# Patient Record
Sex: FEMALE | Race: WHITE | NOT HISPANIC OR LATINO | URBAN - METROPOLITAN AREA
[De-identification: names, ages, dates, MRNs, and addresses within clinical notes are randomized per-mention and may not be internally consistent; named-entity substitution may affect disease eponyms.]

---

## 2017-11-28 ENCOUNTER — IMPORTED ENCOUNTER (OUTPATIENT)
Dept: URBAN - METROPOLITAN AREA CLINIC 9 | Facility: CLINIC | Age: 77
End: 2017-11-28

## 2018-01-19 NOTE — PATIENT DISCUSSION
Patient was upset and argumentative because she only wanted to discuss her smudgy vision with me today.  She did not want to have an exam or be dilated today.  I told her that even if we discussed her complaints she would need a comprehensive eye exam to fully assess her complaints.  She was upset that she also had another appointment at 4 pm and she was still in the office.  She declined/refused to be dilated in the OS against medical advice.  Patient complained that there was poor communication with the schedulers.  Again she was told I would have to examine her in order to diagnosis and treat her.   Patient was uncooperative at times due to her being upset.

## 2018-01-29 ENCOUNTER — IMPORTED ENCOUNTER (OUTPATIENT)
Dept: URBAN - METROPOLITAN AREA CLINIC 9 | Facility: CLINIC | Age: 78
End: 2018-01-29

## 2020-06-15 ENCOUNTER — IMPORTED ENCOUNTER (OUTPATIENT)
Dept: URBAN - METROPOLITAN AREA CLINIC 9 | Facility: CLINIC | Age: 80
End: 2020-06-15

## 2021-06-22 ENCOUNTER — IMPORTED ENCOUNTER (OUTPATIENT)
Dept: URBAN - METROPOLITAN AREA CLINIC 9 | Facility: CLINIC | Age: 81
End: 2021-06-22

## 2021-06-22 PROBLEM — H16.223: Noted: 2021-06-22

## 2021-06-22 PROBLEM — H43.812: Noted: 2021-06-22

## 2021-06-22 PROBLEM — H35.373: Noted: 2021-06-22

## 2021-10-19 ASSESSMENT — VISUAL ACUITY
OS_CC: 20/25 SN
OD_CC: 20/25 SN
OD_CC: 20/25 SN
OD_SC: 20/25 - SN
OS_SC: 20/400 SN
OD_SC: 20/30 SN
OD_SC: 20/25 SN
OS_CC: 20/25 SN
OD_CC: 20/20 SN
OS_CC: 20/25 SN
OD_SC: 20/25 SN
OD_CC: 20/25 SN
OS_CC: 20/25 SN

## 2021-10-19 ASSESSMENT — TONOMETRY
OS_IOP_MMHG: 16
OD_IOP_MMHG: 12
OS_IOP_MMHG: 11
OD_IOP_MMHG: 12
OD_IOP_MMHG: 14
OD_IOP_MMHG: 14
OS_IOP_MMHG: 11
OS_IOP_MMHG: 14

## 2022-06-22 RX ORDER — DENOSUMAB 60 MG/ML
INJECTION SUBCUTANEOUS
COMMUNITY
Start: 2020-10-19 | End: 2022-09-02

## 2022-06-22 RX ORDER — OMEPRAZOLE 20 MG/1
CAPSULE, DELAYED RELEASE ORAL
COMMUNITY
End: 2022-08-04 | Stop reason: SDUPTHER

## 2022-06-22 RX ORDER — BENZONATATE 100 MG/1
CAPSULE ORAL
COMMUNITY

## 2022-06-22 RX ORDER — CITALOPRAM 10 MG/1
15 TABLET ORAL 2 TIMES DAILY
COMMUNITY

## 2022-06-22 RX ORDER — OXYBUTYNIN CHLORIDE 15 MG/1
15 TABLET, EXTENDED RELEASE ORAL DAILY
COMMUNITY
End: 2022-10-10

## 2022-06-22 RX ORDER — ALBUTEROL SULFATE 90 UG/1
AEROSOL, METERED RESPIRATORY (INHALATION)
COMMUNITY

## 2022-06-22 RX ORDER — FLUTICASONE PROPIONATE 50 MCG
SPRAY, SUSPENSION (ML) NASAL
COMMUNITY

## 2022-06-22 RX ORDER — CYCLOSPORINE 0.5 MG/ML
EMULSION OPHTHALMIC
COMMUNITY
End: 2022-10-10

## 2022-06-22 RX ORDER — MELOXICAM 15 MG/1
TABLET ORAL
COMMUNITY

## 2022-06-22 RX ORDER — ATORVASTATIN CALCIUM 20 MG/1
TABLET, FILM COATED ORAL
COMMUNITY
End: 2022-08-04 | Stop reason: SDUPTHER

## 2022-06-22 RX ORDER — MONTELUKAST SODIUM 10 MG/1
TABLET ORAL
COMMUNITY
End: 2022-08-04 | Stop reason: SDUPTHER

## 2022-06-22 RX ORDER — LEVOTHYROXINE SODIUM 0.07 MG/1
TABLET ORAL
COMMUNITY
End: 2022-08-04 | Stop reason: SDUPTHER

## 2022-08-08 PROBLEM — M79.671 FOOT PAIN, RIGHT: Status: ACTIVE | Noted: 2022-08-08

## 2022-08-08 PROBLEM — J30.9 ALLERGIC RHINITIS DUE TO ALLERGEN: Status: ACTIVE | Noted: 2022-08-08

## 2022-08-08 PROBLEM — J42 CHRONIC BRONCHITIS (HCC): Status: ACTIVE | Noted: 2022-08-08

## 2022-08-08 PROBLEM — J45.991 COUGH VARIANT ASTHMA: Status: ACTIVE | Noted: 2022-08-08

## 2022-08-08 PROBLEM — R51.9 BILATERAL HEADACHES: Status: ACTIVE | Noted: 2022-08-08

## 2022-08-08 PROBLEM — I89.0 LYMPHEDEMA: Status: ACTIVE | Noted: 2022-08-08

## 2022-08-08 PROBLEM — K29.80 DUODENITIS: Status: ACTIVE | Noted: 2022-08-08

## 2022-08-08 PROBLEM — M81.0 AGE-RELATED OSTEOPOROSIS WITHOUT CURRENT PATHOLOGICAL FRACTURE: Status: ACTIVE | Noted: 2022-08-08

## 2022-08-08 PROBLEM — M81.0 OSTEOPOROSIS: Status: ACTIVE | Noted: 2022-08-08

## 2022-08-08 PROBLEM — J45.41 MODERATE PERSISTENT ASTHMA WITH EXACERBATION: Status: ACTIVE | Noted: 2022-08-08

## 2022-08-08 PROBLEM — E78.00 ELEVATED LDL CHOLESTEROL LEVEL: Status: ACTIVE | Noted: 2022-08-08

## 2022-08-08 PROBLEM — E03.9 HYPOTHYROIDISM, ACQUIRED: Status: ACTIVE | Noted: 2022-08-08

## 2022-08-08 PROBLEM — M35.3 POLYMYALGIA RHEUMATICA (HCC): Status: ACTIVE | Noted: 2022-08-08

## 2022-08-08 PROBLEM — M25.512 PAIN IN LEFT SHOULDER: Status: ACTIVE | Noted: 2022-08-08

## 2022-08-08 PROBLEM — N95.9 MENOPAUSAL AND POSTMENOPAUSAL DISORDER: Status: ACTIVE | Noted: 2022-08-08

## 2022-08-08 PROBLEM — F32.9 REACTIVE DEPRESSION: Status: ACTIVE | Noted: 2022-08-08

## 2022-08-08 PROBLEM — J32.9 CHRONIC SINUSITIS: Status: ACTIVE | Noted: 2022-08-08

## 2022-08-08 PROBLEM — C50.912 MALIGNANT NEOPLASM OF LEFT FEMALE BREAST (HCC): Status: ACTIVE | Noted: 2022-08-08

## 2022-08-08 PROBLEM — E55.9 VITAMIN D DEFICIENCY: Status: ACTIVE | Noted: 2022-08-08

## 2022-08-08 PROBLEM — N32.81 OVERACTIVE BLADDER: Status: ACTIVE | Noted: 2022-08-08

## 2022-08-08 PROBLEM — I49.5 TACHY-BRADY SYNDROME (HCC): Status: ACTIVE | Noted: 2022-08-08

## 2022-08-08 PROBLEM — E78.00 PURE HYPERCHOLESTEROLEMIA: Status: ACTIVE | Noted: 2022-08-08

## 2022-08-08 PROBLEM — R26.81 UNSTABLE GAIT: Status: ACTIVE | Noted: 2022-08-08

## 2022-08-08 PROBLEM — M67.912 DYSFUNCTION OF LEFT ROTATOR CUFF: Status: ACTIVE | Noted: 2022-08-08

## 2022-08-08 PROBLEM — R73.9 HYPERGLYCEMIA: Status: ACTIVE | Noted: 2022-08-08

## 2022-08-08 PROBLEM — H65.06 RECURRENT ACUTE SEROUS OTITIS MEDIA OF BOTH EARS: Status: ACTIVE | Noted: 2022-08-08

## 2022-08-08 PROBLEM — F41.9 ANXIETY: Status: ACTIVE | Noted: 2022-08-08

## 2022-08-08 PROBLEM — M54.2 CERVICALGIA: Status: ACTIVE | Noted: 2022-08-08

## 2022-08-19 ENCOUNTER — ESTABLISHED PATIENT (OUTPATIENT)
Dept: URBAN - METROPOLITAN AREA CLINIC 9 | Facility: CLINIC | Age: 82
End: 2022-08-19

## 2022-08-19 DIAGNOSIS — H16.223: ICD-10-CM

## 2022-08-19 DIAGNOSIS — H35.373: ICD-10-CM

## 2022-08-19 DIAGNOSIS — H43.812: ICD-10-CM

## 2022-08-19 PROCEDURE — 92015 DETERMINE REFRACTIVE STATE: CPT

## 2022-08-19 PROCEDURE — 92134 CPTRZ OPH DX IMG PST SGM RTA: CPT

## 2022-08-19 PROCEDURE — 92014 COMPRE OPH EXAM EST PT 1/>: CPT

## 2022-08-19 ASSESSMENT — VISUAL ACUITY: OD_SC: 20/20

## 2022-08-19 ASSESSMENT — TONOMETRY
OS_IOP_MMHG: 13
OD_IOP_MMHG: 12

## 2022-08-30 PROBLEM — R32 URINARY INCONTINENCE: Status: ACTIVE | Noted: 2022-08-30

## 2022-08-30 PROBLEM — R00.1 BRADYCARDIA: Status: ACTIVE | Noted: 2022-08-30

## 2022-08-30 PROBLEM — Z00.00 MEDICARE ANNUAL WELLNESS VISIT, SUBSEQUENT: Status: ACTIVE | Noted: 2022-08-30

## 2022-09-29 PROBLEM — Z00.00 MEDICARE ANNUAL WELLNESS VISIT, SUBSEQUENT: Status: RESOLVED | Noted: 2022-08-30 | Resolved: 2022-09-29

## 2022-10-10 PROBLEM — Z87.81 STATUS POST OPEN REDUCTION AND INTERNAL FIXATION (ORIF) OF FRACTURE: Status: ACTIVE | Noted: 2022-10-10

## 2022-10-10 PROBLEM — Z23 NEED FOR INFLUENZA VACCINATION: Status: ACTIVE | Noted: 2022-10-10

## 2022-10-10 PROBLEM — Z98.890 STATUS POST OPEN REDUCTION AND INTERNAL FIXATION (ORIF) OF FRACTURE: Status: ACTIVE | Noted: 2022-10-10

## 2022-10-11 PROBLEM — M79.671 ACUTE FOOT PAIN, RIGHT: Status: ACTIVE | Noted: 2022-10-11

## 2023-08-25 ENCOUNTER — ESTABLISHED PATIENT (OUTPATIENT)
Dept: URBAN - METROPOLITAN AREA CLINIC 9 | Facility: CLINIC | Age: 83
End: 2023-08-25

## 2023-08-25 DIAGNOSIS — H35.373: ICD-10-CM

## 2023-08-25 DIAGNOSIS — H44.23: ICD-10-CM

## 2023-08-25 DIAGNOSIS — Z96.1: ICD-10-CM

## 2023-08-25 DIAGNOSIS — H16.223: ICD-10-CM

## 2023-08-25 DIAGNOSIS — H43.812: ICD-10-CM

## 2023-08-25 PROCEDURE — 92015 DETERMINE REFRACTIVE STATE: CPT

## 2023-08-25 PROCEDURE — 99214 OFFICE O/P EST MOD 30 MIN: CPT

## 2023-08-25 PROCEDURE — 92134 CPTRZ OPH DX IMG PST SGM RTA: CPT

## 2023-08-25 ASSESSMENT — TONOMETRY
OS_IOP_MMHG: 15
OD_IOP_MMHG: 15

## 2023-08-25 ASSESSMENT — VISUAL ACUITY
OD_CC: 20/25
OD_CC: J2
OS_CC: 20/30
OS_CC: J2

## 2024-08-27 ENCOUNTER — COMPREHENSIVE EXAM (OUTPATIENT)
Facility: LOCATION | Age: 84
End: 2024-08-27

## 2024-08-27 DIAGNOSIS — H43.812: ICD-10-CM

## 2024-08-27 DIAGNOSIS — Z96.1: ICD-10-CM

## 2024-08-27 DIAGNOSIS — H16.223: ICD-10-CM

## 2024-08-27 DIAGNOSIS — H44.23: ICD-10-CM

## 2024-08-27 DIAGNOSIS — H35.373: ICD-10-CM

## 2024-08-27 PROCEDURE — 92015 DETERMINE REFRACTIVE STATE: CPT

## 2024-08-27 PROCEDURE — 92014 COMPRE OPH EXAM EST PT 1/>: CPT

## 2024-08-27 PROCEDURE — 92134 CPTRZ OPH DX IMG PST SGM RTA: CPT

## 2024-08-27 ASSESSMENT — TONOMETRY
OS_IOP_MMHG: 14
OD_IOP_MMHG: 14

## 2024-08-27 ASSESSMENT — VISUAL ACUITY
OU_CC: 20/20
OD_CC: 20/20
OU_SC: 20/20
OD_SC: 20/20
OS_SC: J2
OS_SC: 20/400
OS_CC: 20/20

## 2025-03-25 NOTE — PATIENT DISCUSSION
----- Message from Jodi sent at 3/25/2025 11:10 AM CDT -----  Regarding: advice  Who Called: Loly Ramirez daughter Jessa Caller is requesting assistance/information from provider's office.Symptoms (please be specific):  How long has patient had these symptoms:  List of preferred pharmacies on file (remove unneeded): [id number] - [id number] - [address] [facility] [address] If different, enter pharmacy into here including location and phone number: Patient's Preferred Phone Number on File: [phone] Best Call Back Number, if different:Additional Information: stated that the pharmacy did  not receive ciprofloxacin 250 mg/5 ml (CIPRO) and is needing to be resent. Stated that  nurse was faxing a request for and antibiotic injection as well. Please advise.   Monitor.